# Patient Record
Sex: MALE | Race: BLACK OR AFRICAN AMERICAN | Employment: UNEMPLOYED | URBAN - METROPOLITAN AREA
[De-identification: names, ages, dates, MRNs, and addresses within clinical notes are randomized per-mention and may not be internally consistent; named-entity substitution may affect disease eponyms.]

---

## 2017-01-10 ENCOUNTER — OFFICE VISIT (OUTPATIENT)
Dept: PAIN MANAGEMENT | Age: 52
End: 2017-01-10

## 2017-01-10 VITALS
WEIGHT: 310 LBS | SYSTOLIC BLOOD PRESSURE: 154 MMHG | HEART RATE: 94 BPM | DIASTOLIC BLOOD PRESSURE: 79 MMHG | BODY MASS INDEX: 43.24 KG/M2

## 2017-01-10 DIAGNOSIS — Z79.899 ENCOUNTER FOR LONG-TERM (CURRENT) USE OF HIGH-RISK MEDICATION: ICD-10-CM

## 2017-01-10 DIAGNOSIS — E11.40 PAINFUL DIABETIC NEUROPATHY (HCC): ICD-10-CM

## 2017-01-10 DIAGNOSIS — L03.119 CELLULITIS AND ABSCESS OF FOOT: ICD-10-CM

## 2017-01-10 DIAGNOSIS — I73.9 PAD (PERIPHERAL ARTERY DISEASE) (HCC): ICD-10-CM

## 2017-01-10 DIAGNOSIS — L97.509 DIABETIC FOOT ULCER ASSOCIATED WITH DIABETES MELLITUS DUE TO UNDERLYING CONDITION, UNSPECIFIED LATERALITY: ICD-10-CM

## 2017-01-10 DIAGNOSIS — E08.621 DIABETIC FOOT ULCER ASSOCIATED WITH DIABETES MELLITUS DUE TO UNDERLYING CONDITION, UNSPECIFIED LATERALITY: ICD-10-CM

## 2017-01-10 DIAGNOSIS — R26.0 SENSORY ATAXIC GAIT: ICD-10-CM

## 2017-01-10 DIAGNOSIS — M14.679 CHARCOT'S JOINT OF ANKLE, UNSPECIFIED LATERALITY: Primary | ICD-10-CM

## 2017-01-10 DIAGNOSIS — E11.40 DIABETIC NEUROPATHY, PAINFUL (HCC): ICD-10-CM

## 2017-01-10 DIAGNOSIS — L02.619 CELLULITIS AND ABSCESS OF FOOT: ICD-10-CM

## 2017-01-10 RX ORDER — OXYCODONE AND ACETAMINOPHEN 10; 325 MG/1; MG/1
TABLET ORAL
Qty: 240 TAB | Refills: 0 | Status: SHIPPED | OUTPATIENT
Start: 2017-02-08 | End: 2017-03-09 | Stop reason: SDUPTHER

## 2017-01-10 RX ORDER — OXYCODONE AND ACETAMINOPHEN 10; 325 MG/1; MG/1
TABLET ORAL
Qty: 240 TAB | Refills: 0 | Status: SHIPPED | OUTPATIENT
Start: 2017-01-10 | End: 2017-01-10 | Stop reason: SDUPTHER

## 2017-01-10 NOTE — PROGRESS NOTES
HISTORY OF PRESENT ILLNESS  Elaina Quiros is a 46 y.o. male. Patient presents for follow up of chronic lower extremity pain due to severe diabetic neuropathy as well as Charcot foot with complete arch collapse of the left foot. He is on dialysis for end-stage renal disease as well. He continues to suffer from complications related to multiple diabetic left foot ulcers, most recently on the heel. He underwent skin grafting from the right leg to the left heel, but unfortunately the graft did not take. Pain in the lower extremities remains constant but stable. He notes that the pain varies in nature and severity, and predominates in the feet and lower legs; he describes it as throbbing, aching, burning, and stabbing. Pain is significantly worse at night. . Left ankle and foot pain due to Charcot disease remains rather severe. He has had multiple surgeries related to this (all performed by podiatry), which have unfortunately been unsuccessful. Pt reports average of 4-7/10 pain scale most days, depending on activity. He denies any new symptoms. Medications continue to work well for pain control overall. Elaina Quiros is tolerating medications well, with no untoward side effects noted. He is able to stay more active with less discomfort with these current doses. The patient reports an average of 70% relief with this regimen. Most recent UDS and  were consistent with prescribed medications. Pill counts are appropriate. He is informed of side effects, risks, and benefits of this regimen, and emphasizes that he derives a significant improvement in functionality and quality of life, and notes that non-opioid medications and therapies in the past have not offered significant benefit. We discussed his recent UDS which was positive for extremely minute levels of THC (2 ng/dL). He denies any use of marijuana, stating that roommates in his home are daily pot smokers.  He was counseled to open windows and avoid incidental exposure to marijuana smoke. He denies new or worsening insomnia or constipation issues. He denies any falls, injuries, or hospitalizations since the last visit. HPI--see above    ROS  Constitutional: Positive for malaise/fatigue. Musculoskeletal: Positive for myalgias and joint pain. Neurological: Positive for tingling, sensory change and focal weakness (of lower extremities). Physical Exam  Constitutional: He is oriented to person, place, and time. He appears well-developed and well-nourished. Talkative, pleasant male in visible discomfort  Wheel-chair bound   HENT:   Head: Normocephalic and atraumatic.        edentulous   Eyes: EOM are normal. Pupils are equal, round, and reactive to light. Musculoskeletal:        Left ankle: He exhibits decreased range of motion, swelling and deformity (rocker-bottom foot). tenderness. Left lower leg in rigid brace. Great toe absent   Neurological: He is alert and oriented to person, place, and time. He displays atrophy. A sensory deficit is present. No cranial nerve deficit. He exhibits abnormal muscle tone (of left lower extremity). Coordination abnormal.        Diminished sensation of lower limbs to knees bilaterally  1-2 + edema, worse on the left   Psychiatric: He has a normal mood and affect. His behavior is normal. Judgment and thought content normal.   ASSESSMENT and PLAN  Encounter Diagnoses   Name Primary?     Diabetic neuropathy, painful ([address]     Painful diabetic neuropathy ([address]     PAD (peripheral artery disease) ([address]     Cellulitis and abscess of foot     Encounter for long-term (current) use of high-risk medication     Sensory ataxic gait     Diabetic foot ulcer associated with diabetes mellitus due to underlying condition, unspecified laterality ([address]     Charcot's joint of ankle, unspecified laterality Yes   Plan:  Continue same medications as prescribed for chronic pain  Follow up in 3 months or sooner if needed  Regular exercise and attention to emotional health and diet remain the most effective ways to treat chronic pain of all kinds  You may contact me with questions or concerns through [address]

## 2017-01-10 NOTE — MR AVS SNAPSHOT
Visit Information Date & Time Provider Department Dept. Phone Encounter #  
 1/10/2017  2:20 PM Quin Campbell PA-C [address] [place] [address] for Pain Management [phone] [id number] Follow-up Instructions Return in about 3 months (around 4/10/2017). Follow-up and Disposition History Upcoming Health Maintenance Date Due Hepatitis C Screening 1965 FOOT EXAM Q1 6/5/1973 MICROALBUMIN Q1 6/5/1973 EYE EXAM RETINAL OR DILATED Q1 6/5/1973 Pneumococcal 19-64 Highest Risk (1 of 3 - PCV13) 6/5/1982 DTaP/Tdap/Td series (1 - Tdap) 6/5/1984 FOBT Q 1 YEAR AGE 50-75 6/5/2013 INFLUENZA AGE 9 TO ADULT 8/1/2016 HEMOGLOBIN A1C Q6M 2/16/2017 LIPID PANEL Q1 8/16/2017 Allergies as of 1/10/2017  Review Complete On: 11/10/2016 By: WILLIAM Null Severity Noted Reaction Type Reactions Aspirin  03/26/2012    Other (comments) GI distress, unknown Ibuprofen  10/30/2014    Other (comments) Current Immunizations  Never Reviewed No immunizations on file. Not reviewed this visit You Were Diagnosed With   
  
 Codes Comments Charcot's joint of ankle, unspecified laterality    -  Primary ICD-10-CM: E65.162 ICD-9-CM: 094.0, 713.5 Diabetic neuropathy, painful ([facility]     ICD-10-CM: E11.40 ICD-9-CM: 250.60, 357.2 Painful diabetic neuropathy (HCC)     ICD-10-CM: E11.40 ICD-9-CM: 250.60, 357.2 PAD (peripheral artery disease) (HCC)     ICD-10-CM: I73.9 ICD-9-CM: 443.9 Cellulitis and abscess of foot     ICD-10-CM: L03.119, L02.619 ICD-9-CM: 682.7 Encounter for long-term (current) use of high-risk medication     ICD-10-CM: Z79.899 ICD-9-CM: V58.69 Sensory ataxic gait     ICD-10-CM: R26.0 ICD-9-CM: 781.2 Diabetic foot ulcer associated with diabetes mellitus due to underlying condition, unspecified laterality ([facility]     ICD-10-CM: K91.327, Z57.313 ICD-9-CM: 249.80, 707.15 Vitals BP Pulse Weight(growth percentile) BMI Smoking Status 154/79 94 310 lb (140.6 kg) 43.24 kg/m2 Current Every Day Smoker BMI and BSA Data Body Mass Index Body Surface Area  
 43.24 kg/m 2 2.65 m 2 Preferred Pharmacy Pharmacy Name Phone [address] [place] [address] [address], [address] [phone] Your Updated Medication List  
  
   
This list is accurate as of: 1/10/17  3:27 PM.  Always use your most recent med list.  
  
  
  
  
 acetaminophen 325 mg tablet Commonly known as:  TYLENOL Take 650 mg by mouth every four (4) hours as needed for Pain. AMITIZA 24 mcg capsule Generic drug:  lubiPROStone Take 24 mcg by mouth daily (with breakfast). ANUSOL-HC 25 mg Supp Generic drug:  hydrocortisone Insert 25 mg into rectum every twelve (12) hours as needed. aspirin delayed-release 81 mg tablet Take 1 Tab by mouth daily. atorvastatin 40 mg tablet Commonly known as:  LIPITOR Take 1 Tab by mouth daily. citalopram 10 mg tablet Commonly known as:  Adelia Babe Take 10 mg by mouth daily. clopidogrel 75 mg Tab Commonly known as:  PLAVIX Take 1 Tab by mouth daily. COLACE 100 mg capsule Generic drug:  docusate sodium Take 100 mg by mouth daily. fluticasone 50 mcg/actuation nasal spray Commonly known as:  FLONASE  
1 [place] by Both Nostrils route two (2) times a day. FOSRENOL 1,000 mg chewable tablet Generic drug:  lanthanum Take 1,000 mg by mouth three (3) times daily. glipiZIDE SR 10 mg CR tablet Commonly known as:  GLUCOTROL XL Take 10 mg by mouth two (2) times a day. LANTUS 100 unit/mL injection Generic drug:  insulin glargine Inject 10 units Subcutaneously every morning and 30 units Subcutaneously at bedtime  
  
 lidocaine 5 % ointment Commonly known as:  XYLOCAINE Apply 1 g to affected area as needed for Pain. Apply to fistula site before diaylisis LINZESS 290 mcg Cap capsule Generic drug:  linaclotide Take 290 mcg by mouth Daily (before breakfast). LOVAZA 1 gram capsule Generic drug:  omega-3 acid ethyl esters Take 1 g by mouth daily (with breakfast). metoprolol succinate 50 mg XL tablet Commonly known as:  TOPROL-XL Take 50 mg by mouth daily. MIRALAX 17 gram packet Generic drug:  polyethylene glycol Take 17 g by mouth daily. NEPHROCAPS 1 mg capsule Generic drug:  b complex-vitamin c-folic acid Take 1 Cap by mouth daily. NovoLOG Flexpen 100 unit/mL Inpn Generic drug:  insulin aspart  
by SubCUTAneous route. oxyCODONE-acetaminophen  mg per tablet Commonly known as:  PERCOCET 10 Take 1-2 tabs by mouth every 6 hours as needed for chronic pain. Do Not Exceed 8 tabs a Day. Start taking on:  2/8/2017  
  
 pantoprazole 40 mg tablet Commonly known as:  PROTONIX Take 40 mg by mouth daily. RENVELA 800 mg Tab tab Generic drug:  sevelamer carbonate Take 1,600 mg by mouth three (3) times daily (with meals). REQUIP 0.25 mg tablet Generic drug:  rOPINIRole Take 0.25 mg by mouth daily. SENSIPAR 60 mg Tab Generic drug:  cinacalcet Take 60 mg by mouth daily. ZyrTEC 10 mg Cap Generic drug:  Cetirizine Take 10 mg by mouth daily. Prescriptions Printed Refills  
 oxyCODONE-acetaminophen (PERCOCET 10)  mg per tablet 0 Starting on: 2/8/2017 Sig: Take 1-2 tabs by mouth every 6 hours as needed for chronic pain. Do Not Exceed 8 tabs a Day. Class: Print Follow-up Instructions Return in about 3 months (around 4/10/2017). Patient Instructions Plan: 
Continue same medications as prescribed for chronic pain Follow up in 3 months or sooner if needed Regular exercise and attention to emotional health and diet remain the most effective ways to treat chronic pain of all kinds You may contact me with questions or concerns through [address] Introducing [facility]! Ubaldo Hebert introduces [employer] patient portal. Now you can access parts of your medical record, email your doctor's office, and request medication refills online. 1. In your internet browser, go to [email or web address] 2. Click on the First Time User? Click Here link in the Sign In box. You will see the New Member Sign Up page. 3. Enter your [employer] Access Code exactly as it appears below. You will not need to use this code after youve completed the sign-up process. If you do not sign up before the expiration date, you must request a new code. · Fli[employer] Access Code: [email or web address] Expires: 2/8/2017 11:15 AM 
 
4. Enter the last four digits of your Social Security Number (xxxx) and Date of Birth (mm/dd/yyyy) as indicated and click Submit. You will be taken to the next sign-up page. 5. Create a Fliqq ID. This will be your Fliqq login ID and cannot be changed, so think of one that is secure and easy to remember. 6. Create a Fliqq password. You can change your password at any time. 7. Enter your Password Reset Question and Answer. This can be used at a later time if you forget your password. 8. Enter your e-mail address. You will receive e-mail notification when new information is available in [address] 9. Click Sign Up. You can now view and download portions of your medical record. 10. Click the Download Summary menu link to download a portable copy of your medical information. If you have questions, please visit the Frequently Asked Questions section of the Fliqq website. Remember, Fliqq is NOT to be used for urgent needs. For medical emergencies, dial 911. Now available from your iPhone and Android! Please provide this summary of care documentation to your next provider. Your primary care clinician is listed as PROVIDER UNKNOWN.  If you have any questions after today's visit, please call [phone].

## 2017-01-10 NOTE — PROGRESS NOTES
Nursing Notes    Patient presents to the office today in follow-up. Patient rates his pain at 3/10 on the numerical pain scale. Reviewed medications with counts as follows:    Rx Date filled Qty Dispensed Pill Count Last Dose Short   Percocet 10 12/12/16 240 24 1/10/17 no         Comments:     POC UDS was performed in office today, oral swab    Any new labs or imaging since last appointment? YES, pre op labs and imaging, dialysis blood work  Have you been to an emergency room (ER) or urgent care clinic since your last visit? Yes, for heart and leg            Have you been hospitalized since your last visit? Yes, after surgery at [facility] for heart issues   If yes, where, when, and reason for visit? Have you seen or consulted any other health care providers outside of the [address] [place] [address]  since your last visit? NO     If yes, where, when, and reason for visit? Mr. Dagmar Shah has a reminder for a \"due or due soon\" health maintenance. I have asked that he contact his primary care provider for follow-up on this health maintenance.

## 2017-03-09 ENCOUNTER — OFFICE VISIT (OUTPATIENT)
Dept: PAIN MANAGEMENT | Age: 52
End: 2017-03-09

## 2017-03-09 VITALS — DIASTOLIC BLOOD PRESSURE: 81 MMHG | HEART RATE: 92 BPM | RESPIRATION RATE: 19 BRPM | SYSTOLIC BLOOD PRESSURE: 149 MMHG

## 2017-03-09 DIAGNOSIS — G56.22 ULNAR NEUROPATHY OF LEFT UPPER EXTREMITY: ICD-10-CM

## 2017-03-09 DIAGNOSIS — E11.40 PAINFUL DIABETIC NEUROPATHY (HCC): Primary | ICD-10-CM

## 2017-03-09 DIAGNOSIS — L97.509 DIABETIC FOOT ULCER ASSOCIATED WITH DIABETES MELLITUS DUE TO UNDERLYING CONDITION, UNSPECIFIED LATERALITY: ICD-10-CM

## 2017-03-09 DIAGNOSIS — E66.01 MORBID OBESITY DUE TO EXCESS CALORIES (HCC): ICD-10-CM

## 2017-03-09 DIAGNOSIS — Z79.899 ENCOUNTER FOR LONG-TERM (CURRENT) USE OF HIGH-RISK MEDICATION: ICD-10-CM

## 2017-03-09 DIAGNOSIS — M14.679 CHARCOT'S JOINT OF ANKLE, UNSPECIFIED LATERALITY: ICD-10-CM

## 2017-03-09 DIAGNOSIS — E08.621 DIABETIC FOOT ULCER ASSOCIATED WITH DIABETES MELLITUS DUE TO UNDERLYING CONDITION, UNSPECIFIED LATERALITY: ICD-10-CM

## 2017-03-09 DIAGNOSIS — E11.40 DIABETIC NEUROPATHY, PAINFUL (HCC): ICD-10-CM

## 2017-03-09 RX ORDER — OXYCODONE AND ACETAMINOPHEN 10; 325 MG/1; MG/1
TABLET ORAL
Qty: 240 TAB | Refills: 0 | Status: SHIPPED | OUTPATIENT
Start: 2017-03-09 | End: 2017-03-09 | Stop reason: SDUPTHER

## 2017-03-09 RX ORDER — OXYCODONE AND ACETAMINOPHEN 10; 325 MG/1; MG/1
TABLET ORAL
Qty: 240 TAB | Refills: 0 | Status: SHIPPED | OUTPATIENT
Start: 2017-04-07 | End: 2017-03-09 | Stop reason: SDUPTHER

## 2017-03-09 RX ORDER — OXYCODONE AND ACETAMINOPHEN 10; 325 MG/1; MG/1
TABLET ORAL
Qty: 240 TAB | Refills: 0 | Status: SHIPPED | OUTPATIENT
Start: 2017-05-06 | End: 2017-05-30 | Stop reason: SDUPTHER

## 2017-03-09 NOTE — PROGRESS NOTES
Nursing Notes    Patient presents to the office today in follow-up. Reviewed medications with counts as follows:    Rx Date filled Qty Dispensed Pill Count Last Dose Short   Percocet 10/325 2/8/17 240 33 today no   Mr. Isidoro Jacob has a reminder for a \"due or due soon\" health maintenance. I have asked that he contact his primary care provider for follow-up on this health maintenance. POC UDS was not performed in office today    Any new labs or imaging since last appointment? YES    Have you been to an emergency room (ER) or urgent care clinic since your last visit? NO            Have you been hospitalized since your last visit? YES     If yes, where, when, and reason for visit?   5 days at [place] for foot surgery    Have you seen or consulted any other health care providers outside of the [address] [place] [address]  since your last visit? YES     If yes, where, when, and reason for visit?

## 2017-03-09 NOTE — MR AVS SNAPSHOT
Visit Information Date & Time Provider Department Dept. Phone Encounter #  
 3/9/2017  2:00 PM Jackie Solis [facility] for Pain Management [phone] [id number] Follow-up Instructions Return in about 3 months (around 6/9/2017). Follow-up and Disposition History Upcoming Health Maintenance Date Due Hepatitis C Screening 1965 FOOT EXAM Q1 6/5/1973 MICROALBUMIN Q1 6/5/1973 EYE EXAM RETINAL OR DILATED Q1 6/5/1973 Pneumococcal 19-64 Highest Risk (1 of 3 - PCV13) 6/5/1982 DTaP/Tdap/Td series (1 - Tdap) 6/5/1984 FOBT Q 1 YEAR AGE 50-75 6/5/2013 INFLUENZA AGE 9 TO ADULT 8/1/2016 HEMOGLOBIN A1C Q6M 2/16/2017 LIPID PANEL Q1 8/16/2017 Allergies as of 3/9/2017  Review Complete On: 3/9/2017 By: Vaishali Kelley LPN Severity Noted Reaction Type Reactions Aspirin  03/26/2012    Other (comments) GI distress, unknown Ibuprofen  10/30/2014    Other (comments) Current Immunizations  Never Reviewed No immunizations on file. Not reviewed this visit You Were Diagnosed With   
  
 Codes Comments Painful diabetic neuropathy ([facility]    -  Primary ICD-10-CM: E11.40 ICD-9-CM: 250.60, 357.2 Ulnar neuropathy of left upper extremity     ICD-10-CM: G56.22 
ICD-9-CM: 354.2 Diabetic neuropathy, painful ([facility]     ICD-10-CM: E11.40 ICD-9-CM: 250.60, 357.2 Charcot's joint of ankle, unspecified laterality     ICD-10-CM: M14.679 ICD-9-CM: 094.0, 713.5 Diabetic foot ulcer associated with diabetes mellitus due to underlying condition, unspecified laterality ([facility]     ICD-10-CM: K97.642, Z37.996 ICD-9-CM: 249.80, 707.15 Encounter for long-term (current) use of high-risk medication     ICD-10-CM: Z79.899 ICD-9-CM: V58.69 Morbid obesity due to excess calories (HCC)     ICD-10-CM: E66.01 
ICD-9-CM: 278.01 Vitals BP Pulse Resp Smoking Status 149/81 92 19 Current Every Day Smoker Vitals History Preferred Pharmacy Pharmacy Name Phone [address] [place] [address] [address], [address] [facility] [place]. [phone] Your Updated Medication List  
  
   
This list is accurate as of: 3/9/17  2:45 PM.  Always use your most recent med list.  
  
  
  
  
 acetaminophen 325 mg tablet Commonly known as:  TYLENOL Take 650 mg by mouth every four (4) hours as needed for Pain. AMITIZA 24 mcg capsule Generic drug:  lubiPROStone Take 24 mcg by mouth daily (with breakfast). ANUSOL-HC 25 mg Supp Generic drug:  hydrocortisone Insert 25 mg into rectum every twelve (12) hours as needed. aspirin delayed-release 81 mg tablet Take 1 Tab by mouth daily. atorvastatin 40 mg tablet Commonly known as:  LIPITOR Take 1 Tab by mouth daily. citalopram 10 mg tablet Commonly known as:  Mj Flatter Take 10 mg by mouth daily. clopidogrel 75 mg Tab Commonly known as:  PLAVIX Take 1 Tab by mouth daily. COLACE 100 mg capsule Generic drug:  docusate sodium Take 100 mg by mouth daily. fluticasone 50 mcg/actuation nasal spray Commonly known as:  FLONASE  
1 [place] by Both Nostrils route two (2) times a day. FOSRENOL 1,000 mg chewable tablet Generic drug:  lanthanum Take 1,000 mg by mouth three (3) times daily. glipiZIDE SR 10 mg CR tablet Commonly known as:  GLUCOTROL XL Take 10 mg by mouth two (2) times a day. LANTUS 100 unit/mL injection Generic drug:  insulin glargine Inject 10 units Subcutaneously every morning and 30 units Subcutaneously at bedtime  
  
 lidocaine 5 % ointment Commonly known as:  XYLOCAINE Apply 1 g to affected area as needed for Pain. Apply to fistula site before diaylisis LINZESS 290 mcg Cap capsule Generic drug:  linaclotide Take 290 mcg by mouth Daily (before breakfast). LOVAZA 1 gram capsule Generic drug:  omega-3 acid ethyl esters Take 1 g by mouth daily (with breakfast). metoprolol succinate 50 mg XL tablet Commonly known as:  TOPROL-XL Take 50 mg by mouth daily. MIRALAX 17 gram packet Generic drug:  polyethylene glycol Take 17 g by mouth daily. NEPHROCAPS 1 mg capsule Generic drug:  b complex-vitamin c-folic acid Take 1 Cap by mouth daily. NovoLOG Flexpen 100 unit/mL Inpn Generic drug:  insulin aspart  
by SubCUTAneous route. oxyCODONE-acetaminophen  mg per tablet Commonly known as:  PERCOCET 10 Take 1-2 tabs by mouth every 6 hours as needed for chronic pain. Do Not Exceed 8 tabs a Day. Start taking on:  5/6/2017  
  
 pantoprazole 40 mg tablet Commonly known as:  PROTONIX Take 40 mg by mouth daily. RENVELA 800 mg Tab tab Generic drug:  sevelamer carbonate Take 1,600 mg by mouth three (3) times daily (with meals). REQUIP 0.25 mg tablet Generic drug:  rOPINIRole Take 0.25 mg by mouth daily. SENSIPAR 60 mg Tab Generic drug:  cinacalcet Take 60 mg by mouth daily. ZyrTEC 10 mg Cap Generic drug:  Cetirizine Take 10 mg by mouth daily. Prescriptions Printed Refills  
 oxyCODONE-acetaminophen (PERCOCET 10)  mg per tablet 0 Starting on: 5/6/2017 Sig: Take 1-2 tabs by mouth every 6 hours as needed for chronic pain. Do Not Exceed 8 tabs a Day. Class: Print Follow-up Instructions Return in about 3 months (around 6/9/2017). Patient Instructions Plan: 
Continue same medications as prescribed for chronic pain Follow up in 3 months or sooner if needed Regular exercise and attention to emotional health and diet remain the most effective ways to treat chronic pain of all kinds You may contact me with questions or concerns through [address] Introducing [facility]!    
 [employer] introduces [employer] patient portal. Now you can access parts of your medical record, email your doctor's office, and request medication refills online. 1. In your internet browser, go to [email or web address] 2. Click on the First Time User? Click Here link in the Sign In box. You will see the New Member Sign Up page. 3. Enter your [employer] Access Code exactly as it appears below. You will not need to use this code after youve completed the sign-up process. If you do not sign up before the expiration date, you must request a new code. · [employer] Access Code: [email or web address] Expires: 6/7/2017  2:45 PM 
 
4. Enter the last four digits of your Social Security Number (xxxx) and Date of Birth (mm/dd/yyyy) as indicated and click Submit. You will be taken to the next sign-up page. 5. Create a [employer] ID. This will be your [employer] login ID and cannot be changed, so think of one that is secure and easy to remember. 6. Create a [employer] password. You can change your password at any time. 7. Enter your Password Reset Question and Answer. This can be used at a later time if you forget your password. 8. Enter your e-mail address. You will receive e-mail notification when new information is available in [address] 9. Click Sign Up. You can now view and download portions of your medical record. 10. Click the Download Summary menu link to download a portable copy of your medical information. If you have questions, please visit the Frequently Asked Questions section of the [employer] website. Remember, [employer] is NOT to be used for urgent needs. For medical emergencies, dial 911. Now available from your iPhone and Android! Please provide this summary of care documentation to your next provider. Your primary care clinician is listed as PROVIDER UNKNOWN. If you have any questions after today's visit, please call [phone].

## 2017-03-09 NOTE — PROGRESS NOTES
HISTORY OF PRESENT ILLNESS  Hernando Antonio is a 46 y.o. male. Patient presents for follow up of chronic lower extremity pain due to severe diabetic neuropathy as well as Charcot foot with complete arch collapse of the left foot. He is on dialysis for end-stage renal disease as well. He reports that he was hospitalized and underwent urgent surgery to correct what sounds like severe PAD in the right lower leg. He had a vein removed from his left flank to perform bypass surgery as well as skin grafting of the heel. He was in ICU in for four days due to complications from surgery, but it is unclear what these were. He is slowly recovering, and has his leg wrapped in a pillowcase to avoid contact with ambient air and other surfaces. He was not permitted any additional medications for pain. He states he is feeling \"a whole lot better\" but is still having a great deal of discomfort at times. He also complains of pain in the left anterior thigh where they harvested skin for his grafting surgery. He was able to avoid amputation of the right leg \"barely\". Medications continue to work well for pain control overall. Hernando Antonio is tolerating medications well, with no untoward side effects noted. He is able to stay more active with less discomfort with these current doses. The patient reports an average of 60-70 % relief with this regimen. Most recent UDS and  were consistent with prescribed medications. Pill counts are appropriate. He is informed of side effects, risks, and benefits of this regimen, and emphasizes that he derives a significant improvement in functionality and quality of life, and notes that non-opioid medications and therapies in the past have not offered significant benefit. He denies new or worsening insomnia or constipation issues. He denies any falls, injuries, or hospitalizations since the last visit. HPI--see above    ROS  Constitutional: Positive for malaise/fatigue.    Musculoskeletal: Positive for myalgias and joint pain. Neurological: Positive for tingling, sensory change and focal weakness (of lower extremities). Physical Exam  Constitutional: He is oriented to person, place, and time. He appears well-developed and well-nourished. Talkative, pleasant male in visible discomfort  Wheel-chair bound   HENT:   Head: Normocephalic and atraumatic.        edentulous   Eyes: EOM are normal. Pupils are equal, round, and reactive to light. Musculoskeletal:        Left ankle: He exhibits decreased range of motion, swelling and deformity (rocker-bottom foot). tenderness. Left lower leg in rigid brace. Great toe absent  Right leg wrapped in a pillowcase   Neurological: He is alert and oriented to person, place, and time. He displays atrophy. A sensory deficit is present. No cranial nerve deficit. He exhibits abnormal muscle tone (of left lower extremity). Coordination abnormal.        Diminished sensation of lower limbs to knees bilaterally  1-2 + edema, worse on the left   Psychiatric: He has a normal mood and affect. His behavior is normal. Judgment and thought content normal.   ASSESSMENT and PLAN    ICD-10-CM ICD-9-CM    1. Painful diabetic neuropathy (HCC) E11.40 250.60      357.2    2. Ulnar neuropathy of left upper extremity G56.22 354.2    3. Diabetic neuropathy, painful (HCC) E11.40 250.60      357.2    4. Charcot's joint of ankle, unspecified laterality M14.679 094.0      713.5    5. Diabetic foot ulcer associated with diabetes mellitus due to underlying condition, unspecified laterality ([facility] E08.621 249.80     L97.509 707.15    6. Encounter for long-term (current) use of high-risk medication Z79.899 V58.69    7.  Morbid obesity due to excess calories ([facility]) E66.01 278.01       Plan:  Continue same medications as prescribed for chronic pain  Follow up in 3 months or sooner if needed  Regular exercise and attention to emotional health and diet remain the most effective ways to treat chronic pain of all kinds  You may contact me with questions or concerns through [address]

## 2017-05-08 PROBLEM — L08.9 DIABETIC FOOT INFECTION (HCC): Status: ACTIVE | Noted: 2017-05-08

## 2017-05-08 PROBLEM — R50.9 FEVER: Status: ACTIVE | Noted: 2017-05-08

## 2017-05-08 PROBLEM — D72.829 LEUKOCYTOSIS: Status: ACTIVE | Noted: 2017-05-08

## 2017-05-08 PROBLEM — E11.628 DIABETIC FOOT INFECTION (HCC): Status: ACTIVE | Noted: 2017-05-08

## 2017-05-17 ENCOUNTER — TELEPHONE (OUTPATIENT)
Dept: PAIN MANAGEMENT | Age: 52
End: 2017-05-17

## 2017-05-30 ENCOUNTER — HOSPITAL ENCOUNTER (OUTPATIENT)
Dept: LAB | Age: 52
Discharge: HOME | End: 2017-05-30
Payer: MEDICARE

## 2017-05-30 ENCOUNTER — OFFICE VISIT (OUTPATIENT)
Dept: PAIN MANAGEMENT | Age: 52
End: 2017-05-30

## 2017-05-30 VITALS
HEART RATE: 109 BPM | SYSTOLIC BLOOD PRESSURE: 99 MMHG | DIASTOLIC BLOOD PRESSURE: 65 MMHG | WEIGHT: 285 LBS | BODY MASS INDEX: 37.6 KG/M2

## 2017-05-30 DIAGNOSIS — Z79.899 ENCOUNTER FOR LONG-TERM (CURRENT) USE OF HIGH-RISK MEDICATION: ICD-10-CM

## 2017-05-30 DIAGNOSIS — Z79.899 ENCOUNTER FOR LONG-TERM (CURRENT) USE OF HIGH-RISK MEDICATION: Primary | ICD-10-CM

## 2017-05-30 DIAGNOSIS — E11.40 PAINFUL DIABETIC NEUROPATHY (HCC): Primary | ICD-10-CM

## 2017-05-30 DIAGNOSIS — G89.4 CHRONIC PAIN SYNDROME: ICD-10-CM

## 2017-05-30 PROCEDURE — 36415 COLL VENOUS BLD VENIPUNCTURE: CPT | Performed by: PSYCHIATRY & NEUROLOGY

## 2017-05-30 PROCEDURE — 80307 DRUG TEST PRSMV CHEM ANLYZR: CPT | Performed by: PSYCHIATRY & NEUROLOGY

## 2017-05-30 RX ORDER — OXYCODONE AND ACETAMINOPHEN 10; 325 MG/1; MG/1
TABLET ORAL
Qty: 240 TAB | Refills: 0 | Status: SHIPPED | OUTPATIENT
Start: 2017-06-14 | End: 2018-05-07

## 2017-05-30 RX ORDER — NALOXONE HYDROCHLORIDE 4 MG/.1ML
4 SPRAY NASAL AS NEEDED
Qty: 1 BOX | Refills: 1 | Status: SHIPPED | OUTPATIENT
Start: 2017-05-30 | End: 2018-05-07

## 2017-05-30 RX ORDER — OXYCODONE AND ACETAMINOPHEN 10; 325 MG/1; MG/1
TABLET ORAL
Qty: 240 TAB | Refills: 0 | Status: SHIPPED | OUTPATIENT
Start: 2017-08-10 | End: 2017-10-17 | Stop reason: SDUPTHER

## 2017-05-30 RX ORDER — OXYCODONE AND ACETAMINOPHEN 10; 325 MG/1; MG/1
TABLET ORAL
Qty: 240 TAB | Refills: 0 | Status: SHIPPED | OUTPATIENT
Start: 2017-07-12 | End: 2017-09-26 | Stop reason: SDUPTHER

## 2017-05-30 NOTE — PROGRESS NOTES
HISTORY OF PRESENT ILLNESS  Marino Morin is a 46 y.o. male. HPI Patient presents for follow up of chronic lower extremity pain due to severe diabetic neuropathy as well as Charcot foot with complete arch collapse of the left foot. He is on dialysis for end-stage renal disease as well. Since last seen, he was hospitalized secondary to leukocytosis and an infected diabetic foot ulcer. Medications continue to work well for pain control overall. Marino Morin is tolerating medications well, with no untoward side effects noted. He is able to stay more active with less discomfort with these current doses. The patient reports an average of 60-70 % relief with this regimen. Most recent UDS and  were consistent with prescribed medications. Pill counts are appropriate. He is informed of side effects, risks, and benefits of this regimen, and emphasizes that he derives a significant improvement in functionality and quality of life, and notes that non-opioid medications and therapies in the past have not offered significant benefit. Pain level today 7 out of 10, outcome 14/28,(The lower the upper number, the better the outcome)  Physical activity and mobility remain curtailed, he is wheelchair-bound. Sleep and mood are fair. Mild constipation reported. The opioid-induced constipation protocol is reviewed and suggestions implemented. A current review of the  does not identify any inconsistency. A urine drug screen could not be obtained today. Accordingly a salivary fluid specimen is obtained and forwarded to the laboratory for analysis. Review of Systems   Constitutional: Positive for malaise/fatigue. HENT: Positive for hearing loss. Eyes: Positive for pain. Respiratory: Positive for wheezing. Cardiovascular: Positive for leg swelling. Gastrointestinal: Positive for constipation, heartburn, nausea and vomiting. Musculoskeletal: Positive for back pain, joint pain and myalgias. Neurological: Positive for tingling, sensory change and focal weakness. Psychiatric/Behavioral: The patient has insomnia. All other systems reviewed and are negative. Physical Exam   Constitutional: He is oriented to person, place, and time. He appears well-developed and well-nourished. No distress. No teeth   HENT:   Head: Normocephalic. Right Ear: External ear normal.   Left Ear: External ear normal.   Eyes: Conjunctivae and EOM are normal. Pupils are equal, round, and reactive to light. Neck: Normal range of motion. Pulmonary/Chest: Effort normal. No respiratory distress. Musculoskeletal:        Right foot: There is tenderness (left great toe amputated). Left foot: There is decreased range of motion and tenderness (in air cast). Diabetic foot ulcers both feet     Neurological: He is alert and oriented to person, place, and time. Gait (antalgic) abnormal.   Skin: Skin is warm and dry. Psychiatric: He has a normal mood and affect. His behavior is normal. Judgment and thought content normal.   Nursing note and vitals reviewed. ASSESSMENT and PLAN  Encounter Diagnoses   Name Primary?  Painful diabetic neuropathy ([facility] Yes    Encounter for long-term (current) use of high-risk medication     Chronic pain syndrome      He will continue his current analgesic regimen as this is providing adequate pain control with improved functionality and minimal side effects. Because the patient's current regimen places him/her at increased risk for possible overdose, a prescription for naloxone nasal spray is being provided. The patient understands that this medication is only to be used in the setting of a possible overdose and that inadvertent use of this medication could precipitate overt withdrawal.  3 month reassess him      No concerns are raised for misuse, abuse, or diversion.     1. Pain medications are prescribed with the objective of pain relief and improved physical and psychosocial function in this patient. 2. Counseled patient on proper use of prescribed medications and reviewed opioid contract. 3. Counseled patient about chronic medical conditions and their relationship to anxiety and depression and recommended mental health support as needed. 4. Reviewed with patient self-help tools, home exercise, and lifestyle changes to assist the patient in self-management of symptoms. 5. Advised patient to have a primary care provider to continue care for health maintenance and general medical conditions and support for referral to specialty care as needed. 6. Reviewed with patient the treatment plan, goals of treatment plan, and limitations of treatment plan, to include the potential for side effects from medications and procedures. If side effects occur, it is the responsibility of the patient to inform the clinic so that a change in the treatment plan can be made in a safe manner. The patient is advised that stopping prescribed medication may cause an increase in symptoms and possible medication withdrawal symptoms. The patient is informed an emergency room evaluation may be necessary if this occurs. DISPOSITION: The patients condition and plan were discussed at length and all questions were answered. The patient agrees with the plan.     Counseling occupied > 50% of visit:  Total time: 30 minutes

## 2017-05-30 NOTE — PROGRESS NOTES
Romario Giraldo Notes    Patient presents to the office today in follow-up. Patient rates his pain at 7/10 on the numerical pain scale. Reviewed medications with counts as follows:    Rx Date filled Qty Dispensed Pill Count Last Dose Short   Percocet 10/325 mg 05/16/17 240 89 05/30/17 One day   Comments:Pt is one day short,  States taking little more     POC UDS was performed in office today,oral swab    Any new labs or imaging since last appointment? YES per op labs and imaging for skin grafting    Have you been to an emergency room (ER) or urgent care clinic since your last visit? YES            Have you been hospitalized since your last visit? YES     If yes, where, when, and reason for visit? [facility]      Have you seen or consulted any other health care providers outside of the [address] [place] [address]  since your last visit? YES     If yes, where, when, and reason for visit?

## 2017-05-30 NOTE — MR AVS SNAPSHOT
Visit Information Date & Time Provider Department Dept. Phone Encounter #  
 5/30/2017 10:20 AM Mary Haider MD [address] [place] [address] for Pain Management [phone] [id number] Follow-up Instructions Return in about 3 months (around 8/30/2017). Follow-up and Disposition History Upcoming Health Maintenance Date Due Hepatitis C Screening 1965 FOOT EXAM Q1 6/5/1973 MICROALBUMIN Q1 6/5/1973 EYE EXAM RETINAL OR DILATED Q1 6/5/1973 Pneumococcal 19-64 Highest Risk (1 of 3 - PCV13) 6/5/1982 DTaP/Tdap/Td series (1 - Tdap) 6/5/1984 FOBT Q 1 YEAR AGE 50-75 6/5/2013 INFLUENZA AGE 9 TO ADULT 8/1/2017 LIPID PANEL Q1 8/16/2017 HEMOGLOBIN A1C Q6M 11/8/2017 Allergies as of 5/30/2017  Review Complete On: 5/30/2017 By: Mary Haider MD  
  
 Severity Noted Reaction Type Reactions Aspirin  03/26/2012    Other (comments) GI distress, unknown Ibuprofen  10/30/2014    Other (comments) Current Immunizations  Never Reviewed No immunizations on file. Not reviewed this visit You Were Diagnosed With   
  
 Codes Comments Painful diabetic neuropathy ([facility]    -  Primary ICD-10-CM: E11.40 ICD-9-CM: 250.60, 357.2 Encounter for long-term (current) use of high-risk medication     ICD-10-CM: Z79.899 ICD-9-CM: V58.69 Chronic pain syndrome     ICD-10-CM: G89.4 ICD-9-CM: 338. 4 Vitals BP Pulse Weight(growth percentile) BMI Smoking Status 99/65 (!) 109 285 lb (129.3 kg) 37.6 kg/m2 Current Every Day Smoker BMI and BSA Data Body Mass Index Body Surface Area  
 37.6 kg/m 2 2.58 m 2 Preferred Pharmacy Pharmacy Name Phone [address] [place] [address] [address], [address] [phone] Your Updated Medication List  
  
   
This list is accurate as of: 5/30/17 12:15 PM.  Always use your most recent med list.  
  
  
  
  
 acetaminophen 325 mg tablet Commonly known as:  TYLENOL  
 Take 650 mg by mouth every four (4) hours as needed for Pain. atorvastatin 40 mg tablet Commonly known as:  LIPITOR Take 1 Tab by mouth daily. clopidogrel 75 mg Tab Commonly known as:  PLAVIX Take 1 Tab by mouth daily. COLACE 100 mg capsule Generic drug:  docusate sodium Take 100 mg by mouth daily. FOSRENOL 1,000 mg chewable tablet Generic drug:  lanthanum Take 1,000 mg by mouth three (3) times daily. glipiZIDE SR 10 mg CR tablet Commonly known as:  GLUCOTROL XL Take 10 mg by mouth two (2) times a day. levoFLOXacin 500 mg tablet Commonly known as:  Laretta Bending Take 1 Tab by mouth every fourty-eight (48) hours. lidocaine 5 % ointment Commonly known as:  XYLOCAINE Apply 1 g to affected area as needed for Pain. Apply to fistula site before diaylisis LINZESS 290 mcg Cap capsule Generic drug:  linaclotide Take 290 mcg by mouth Daily (before breakfast). naloxone 4 mg/actuation Spry 4 mg by Nasal route as needed for up to 2 doses. Indications: OPIOID TOXICITY  
  
 NEPHROCAPS 1 mg capsule Generic drug:  b complex-vitamin c-folic acid Take 1 Cap by mouth daily. * oxyCODONE-acetaminophen  mg per tablet Commonly known as:  PERCOCET 10 Take 1-2 tabs by mouth every 6 hours as needed for chronic pain. Do Not Exceed 8 tabs a Day. Indications: Pain Start taking on:  6/14/2017 * oxyCODONE-acetaminophen  mg per tablet Commonly known as:  PERCOCET 10 Take 1-2 tabs by mouth every 6 hours as needed for chronic pain. Do Not Exceed 8 tabs a Day. Indications: Pain Start taking on:  7/12/2017 * oxyCODONE-acetaminophen  mg per tablet Commonly known as:  PERCOCET 10 Take 1-2 tabs by mouth every 6 hours as needed for chronic pain. Do Not Exceed 8 tabs a Day. Indications: Pain Start taking on:  8/10/2017  
  
 pantoprazole 40 mg tablet Commonly known as:  PROTONIX Take 40 mg by mouth daily. RENVELA 800 mg Tab tab Generic drug:  sevelamer carbonate Take 1,600 mg by mouth three (3) times daily (with meals). REQUIP 0.25 mg tablet Generic drug:  rOPINIRole Take 0.25 mg by mouth daily. SENSIPAR 60 mg Tab Generic drug:  cinacalcet Take 60 mg by mouth daily. ZyrTEC 10 mg Cap Generic drug:  Cetirizine Take 10 mg by mouth daily. * Notice: This list has 3 medication(s) that are the same as other medications prescribed for you. Read the directions carefully, and ask your doctor or other care provider to review them with you. Prescriptions Printed Refills  
 oxyCODONE-acetaminophen (PERCOCET 10)  mg per tablet 0 Starting on: 8/10/2017 Sig: Take 1-2 tabs by mouth every 6 hours as needed for chronic pain. Do Not Exceed 8 tabs a Day. Indications: Pain Class: Print  
 oxyCODONE-acetaminophen (PERCOCET 10)  mg per tablet 0 Starting on: 2017 Sig: Take 1-2 tabs by mouth every 6 hours as needed for chronic pain. Do Not Exceed 8 tabs a Day. Indications: Pain Class: Print  
 oxyCODONE-acetaminophen (PERCOCET 10)  mg per tablet 0 Starting on: 2017 Sig: Take 1-2 tabs by mouth every 6 hours as needed for chronic pain. Do Not Exceed 8 tabs a Day. Indications: Pain Class: Print Prescriptions Sent to Pharmacy Refills  
 naloxone 4 mg/actuation spry 1 Si mg by Nasal route as needed for up to 2 doses. Indications: OPIOID TOXICITY Class: Normal  
 Pharmacy: [address] [place].  #: [phone] Route: Nasal  
  
We Performed the Following DRUG SCREEN [[id number] Custom] Follow-up Instructions Return in about 3 months (around 2017). Patient Instructions Current health maintenance issues were reviewed and the patient was advised to followup with his/her PCP for completion of these items. Introducing [facility]! Shaila Nash introduces [facility] patient portal. Now you can access parts of your medical record, email your doctor's office, and request medication refills online. 1. In your internet browser, go to [email or web address] 2. Click on the First Time User? Click Here link in the Sign In box. You will see the New Member Sign Up page. 3. Enter your [facility] Access Code exactly as it appears below. You will not need to use this code after youve completed the sign-up process. If you do not sign up before the expiration date, you must request a new code. · [facility] Access Code: [email or web address] Expires: 6/7/2017  3:45 PM 
 
4. Enter the last four digits of your Social Security Number (xxxx) and Date of Birth (mm/dd/yyyy) as indicated and click Submit. You will be taken to the next sign-up page. 5. Create a [facility] ID. This will be your [facility] login ID and cannot be changed, so think of one that is secure and easy to remember. 6. Create a [facility] password. You can change your password at any time. 7. Enter your Password Reset Question and Answer. This can be used at a later time if you forget your password. 8. Enter your e-mail address. You will receive e-mail notification when new information is available in [address] 9. Click Sign Up. You can now view and download portions of your medical record. 10. Click the Download Summary menu link to download a portable copy of your medical information. If you have questions, please visit the Frequently Asked Questions section of the [facility] website. Remember, [facility] is NOT to be used for urgent needs. For medical emergencies, dial 911. Now available from your iPhone and Android! Please provide this summary of care documentation to your next provider. Your primary care clinician is listed as Jeannie August. If you have any questions after today's visit, please call [phone].

## 2017-06-09 LAB
6-ACETYLMORPHINE: NEGATIVE
AMPHETAMINES SERPL QL SCN: NEGATIVE NG/ML
BARBITURATES SERPL QL SCN: NEGATIVE UG/ML
BENZODIAZ SERPL QL SCN: NEGATIVE NG/ML
CANNABINOIDS SERPL QL SCN: NEGATIVE NG/ML
COCAINE+BZE SERPL QL SCN: NEGATIVE NG/ML
CODEINE, 737848: NEGATIVE NG/ML
DIHYDROCODEINE, 764159: NEGATIVE NG/ML
HYDROCODONE, 737854: NEGATIVE NG/ML
HYDROMORPHONE, 737852: NEGATIVE NG/ML
METHADONE SERPL QL SCN: NEGATIVE NG/ML
MORPHINE, 737850: NEGATIVE NG/ML
OPIATE CONFIRMATION,OPIC: NEGATIVE
OPIATES SERPL QL SCN: NEGATIVE NG/ML
OXYCOCONE: 15 NG/ML
OXYCODONE, 790407: NORMAL NG/ML
OXYCODONES CONFIRMATION, 738393: POSITIVE
OXYMORPHONE, 763902: NEGATIVE NG/ML
PCP SERPL QL SCN: NEGATIVE NG/ML
PROPOXYPH SERPL QL SCN: NEGATIVE NG/ML

## 2017-09-26 ENCOUNTER — OFFICE VISIT (OUTPATIENT)
Dept: PAIN MANAGEMENT | Age: 52
End: 2017-09-26

## 2017-09-26 DIAGNOSIS — E11.40 DIABETIC NEUROPATHY, PAINFUL (HCC): Primary | ICD-10-CM

## 2017-09-26 RX ORDER — OXYCODONE AND ACETAMINOPHEN 10; 325 MG/1; MG/1
TABLET ORAL
Qty: 240 TAB | Refills: 0 | Status: SHIPPED | OUTPATIENT
Start: 2017-09-26 | End: 2018-05-07

## 2017-10-17 ENCOUNTER — OFFICE VISIT (OUTPATIENT)
Dept: PAIN MANAGEMENT | Age: 52
End: 2017-10-17

## 2017-10-17 VITALS
SYSTOLIC BLOOD PRESSURE: 120 MMHG | WEIGHT: 285 LBS | BODY MASS INDEX: 37.6 KG/M2 | DIASTOLIC BLOOD PRESSURE: 70 MMHG | RESPIRATION RATE: 16 BRPM | TEMPERATURE: 98.8 F | HEART RATE: 87 BPM

## 2017-10-17 DIAGNOSIS — E11.40 PAINFUL DIABETIC NEUROPATHY (HCC): ICD-10-CM

## 2017-10-17 DIAGNOSIS — E11.40 DIABETIC NEUROPATHY, PAINFUL (HCC): Primary | ICD-10-CM

## 2017-10-17 DIAGNOSIS — R26.0 SENSORY ATAXIC GAIT: ICD-10-CM

## 2017-10-17 DIAGNOSIS — Z99.2 STAGE 5 CHRONIC KIDNEY DISEASE ON CHRONIC DIALYSIS (HCC): ICD-10-CM

## 2017-10-17 DIAGNOSIS — L08.9 DIABETIC FOOT INFECTION (HCC): ICD-10-CM

## 2017-10-17 DIAGNOSIS — L03.119 CELLULITIS AND ABSCESS OF FOOT: ICD-10-CM

## 2017-10-17 DIAGNOSIS — E11.628 DIABETIC FOOT INFECTION (HCC): ICD-10-CM

## 2017-10-17 DIAGNOSIS — N18.6 STAGE 5 CHRONIC KIDNEY DISEASE ON CHRONIC DIALYSIS (HCC): ICD-10-CM

## 2017-10-17 DIAGNOSIS — Z89.511 STATUS POST BELOW KNEE AMPUTATION OF RIGHT LOWER EXTREMITY (HCC): ICD-10-CM

## 2017-10-17 DIAGNOSIS — Z79.899 ENCOUNTER FOR LONG-TERM (CURRENT) USE OF HIGH-RISK MEDICATION: ICD-10-CM

## 2017-10-17 DIAGNOSIS — M14.679 CHARCOT'S JOINT OF ANKLE, UNSPECIFIED LATERALITY: ICD-10-CM

## 2017-10-17 DIAGNOSIS — L02.619 CELLULITIS AND ABSCESS OF FOOT: ICD-10-CM

## 2017-10-17 RX ORDER — OXYCODONE AND ACETAMINOPHEN 10; 325 MG/1; MG/1
TABLET ORAL
Qty: 240 TAB | Refills: 0 | Status: SHIPPED | OUTPATIENT
Start: 2017-10-25 | End: 2017-10-17 | Stop reason: SDUPTHER

## 2017-10-17 RX ORDER — OXYCODONE AND ACETAMINOPHEN 10; 325 MG/1; MG/1
TABLET ORAL
Qty: 240 TAB | Refills: 0 | Status: SHIPPED | OUTPATIENT
Start: 2017-12-22 | End: 2018-05-07

## 2017-10-17 RX ORDER — OXYCODONE AND ACETAMINOPHEN 10; 325 MG/1; MG/1
TABLET ORAL
Qty: 240 TAB | Refills: 0 | Status: SHIPPED | OUTPATIENT
Start: 2017-11-23 | End: 2017-10-17 | Stop reason: SDUPTHER

## 2017-10-17 NOTE — PATIENT INSTRUCTIONS
Plan:  Continue same medications as prescribed for chronic pain  Please consider seeking treatment with a different pain management group.  We will refer you to [facility] pain management (lead physician is Dr. Lizbet Helm) for continued management of your chronic pain  Follow up in 3 months or sooner if needed  Regular exercise and attention to emotional health and diet remain the most effective ways to treat chronic pain of all kinds  You may contact me with questions or concerns through [address]

## 2017-10-17 NOTE — MR AVS SNAPSHOT
Visit Information Date & Time Provider Department Dept. Phone Encounter #  
 10/17/2017  3:40 PM Corinne Walker [address] [place] [address] for Pain Management [phone] [id number] Follow-up Instructions Return in about 3 months (around 1/17/2018). Follow-up and Disposition History Upcoming Health Maintenance Date Due Hepatitis C Screening 1965 FOOT EXAM Q1 6/5/1973 MICROALBUMIN Q1 6/5/1973 EYE EXAM RETINAL OR DILATED Q1 6/5/1973 Pneumococcal 19-64 Highest Risk (1 of 3 - PCV13) 6/5/1982 DTaP/Tdap/Td series (1 - Tdap) 6/5/1984 FOBT Q 1 YEAR AGE 50-75 6/5/2013 INFLUENZA AGE 9 TO ADULT 8/1/2017 LIPID PANEL Q1 8/16/2017 HEMOGLOBIN A1C Q6M 11/8/2017 Allergies as of 10/17/2017  Review Complete On: 10/17/2017 By: Geovanna Obando LPN Severity Noted Reaction Type Reactions Aspirin  03/26/2012    Other (comments) GI distress, unknown Ibuprofen  10/30/2014    Other (comments) Current Immunizations  Never Reviewed No immunizations on file. Not reviewed this visit You Were Diagnosed With   
  
 Codes Comments Diabetic neuropathy, painful ([facility]    -  Primary ICD-10-CM: E11.40 ICD-9-CM: 250.60, 357.2 Painful diabetic neuropathy (HCC)     ICD-10-CM: E11.40 ICD-9-CM: 250.60, 357.2 Charcot's joint of ankle, unspecified laterality     ICD-10-CM: M14.679 ICD-9-CM: 094.0, 713.5 Sensory ataxic gait     ICD-10-CM: R26.0 ICD-9-CM: 781.2 Encounter for long-term (current) use of high-risk medication     ICD-10-CM: Z79.899 ICD-9-CM: V58.69 Stage 5 chronic kidney disease on chronic dialysis (HCC)     ICD-10-CM: N18.6, Z99.2 ICD-9-CM: 585.6, V45.11 Cellulitis and abscess of foot     ICD-10-CM: L03.119, L02.619 ICD-9-CM: 682.7 Diabetic foot infection ([facility]     ICD-10-CM: E11.69, L08.9 ICD-9-CM: 250.80, 686.9  Status post below knee amputation of right lower extremity ([facility] ICD-10-CM: P24.546 ICD-9-CM: V49.75 Vitals BP Pulse Temp Resp Weight(growth percentile) BMI  
 120/70 87 98.8 °F (37.1 °C) 16 285 lb (129.3 kg) 37.6 kg/m2 Smoking Status Current Every Day Smoker BMI and BSA Data Body Mass Index Body Surface Area  
 37.6 kg/m 2 2.58 m 2 Preferred Pharmacy Pharmacy Name Phone [address] [place] [address] [address], [address] [facility] [place]. [phone] Your Updated Medication List  
  
   
This list is accurate as of: 10/17/17  4:53 PM.  Always use your most recent med list.  
  
  
  
  
 acetaminophen 325 mg tablet Commonly known as:  TYLENOL Take 650 mg by mouth every four (4) hours as needed for Pain. atorvastatin 40 mg tablet Commonly known as:  LIPITOR Take 1 Tab by mouth daily. clopidogrel 75 mg Tab Commonly known as:  PLAVIX Take 1 Tab by mouth daily. COLACE 100 mg capsule Generic drug:  docusate sodium Take 100 mg by mouth daily. FOSRENOL 1,000 mg chewable tablet Generic drug:  lanthanum Take 1,000 mg by mouth three (3) times daily. glipiZIDE SR 10 mg CR tablet Commonly known as:  GLUCOTROL XL Take 10 mg by mouth two (2) times a day. levoFLOXacin 500 mg tablet Commonly known as:  [place]et Take 1 Tab by mouth every fourty-eight (48) hours. lidocaine 5 % ointment Commonly known as:  XYLOCAINE Apply 1 g to affected area as needed for Pain. Apply to fistula site before diaylisis LINZESS 290 mcg Cap capsule Generic drug:  linaclotide Take 290 mcg by mouth Daily (before breakfast). naloxone 4 mg/actuation nasal spray Commonly known as:  NARCAN  
4 mg by Nasal route as needed for up to 2 doses. Indications: OPIOID TOXICITY  
  
 NEPHROCAPS 1 mg capsule Generic drug:  b complex-vitamin c-folic acid Take 1 Cap by mouth daily. * oxyCODONE-acetaminophen  mg per tablet Commonly known as:  PERCOCET 10  
 Take 1-2 tabs by mouth every 6 hours as needed for chronic pain. Do Not Exceed 8 tabs a Day. Indications: Pain * oxyCODONE-acetaminophen  mg per tablet Commonly known as:  PERCOCET 10 Take 1-2 tabs by mouth every 6 hours as needed for chronic pain. Do Not Exceed 8 tabs a Day. Indications: Pain * oxyCODONE-acetaminophen  mg per tablet Commonly known as:  PERCOCET 10 Take 1-2 tabs by mouth every 6 hours as needed for chronic pain. Do Not Exceed 8 tabs a Day. Indications: Pain Start taking on:  12/22/2017  
  
 pantoprazole 40 mg tablet Commonly known as:  PROTONIX Take 40 mg by mouth daily. RENVELA 800 mg Tab tab Generic drug:  sevelamer carbonate Take 1,600 mg by mouth three (3) times daily (with meals). REQUIP 0.25 mg tablet Generic drug:  rOPINIRole Take 0.25 mg by mouth daily. SENSIPAR 60 mg Tab Generic drug:  cinacalcet Take 60 mg by mouth daily. ZyrTEC 10 mg Cap Generic drug:  Cetirizine Take 10 mg by mouth daily. * Notice: This list has 3 medication(s) that are the same as other medications prescribed for you. Read the directions carefully, and ask your doctor or other care provider to review them with you. Prescriptions Printed Refills  
 oxyCODONE-acetaminophen (PERCOCET 10)  mg per tablet 0 Starting on: 12/22/2017 Sig: Take 1-2 tabs by mouth every 6 hours as needed for chronic pain. Do Not Exceed 8 tabs a Day. Indications: Pain Class: Print We Performed the Following REFERRAL TO PAIN MANAGEMENT [VSX984 Custom] Comments:  
 Mr. Sharri Mcardle has been a patient in our practice for more than four years for the treatment of chronic, severe pain related to peripheral diabetic neuropathy. He is recently s/p right BKA due to osteomyelitis and is now experiencing phantom limb pain symptoms. Due to changes in staffing, our practice is no longer able to treat neuropathic pain.  He thus requests a referral to your practice to continue his current medication regimen, which has kept his pain under adequate control. Thank you in advance for seeing this pleasant patient. Follow-up Instructions Return in about 3 months (around 1/17/2018). Referral Information Referral ID Referred By Referred To  
  
 [id number] Nelda AGUAYO [address]   
   [address] [place] [address] Phone: [phone] Fax: [phone] Visits Status Start Date End Date 1 New Request 10/17/17 10/17/18 If your referral has a status of pending review or denied, additional information will be sent to support the outcome of this decision. Patient Instructions Plan: 
Continue same medications as prescribed for chronic pain Please consider seeking treatment with a different pain management group. We will refer you to [facility] pain management (lead physician is Dr. Nazia Law) for continued management of your chronic pain Follow up in 3 months or sooner if needed Regular exercise and attention to emotional health and diet remain the most effective ways to treat chronic pain of all kinds You may contact me with questions or concerns through [address] Patient Instructions History Introducing [facility]! [employer] introduces [employer] patient portal. Now you can access parts of your medical record, email your doctor's office, and request medication refills online. 1. In your internet browser, go to [email or web address] 2. Click on the First Time User? Click Here link in the Sign In box. You will see the New Member Sign Up page. 3. Enter your [employer] Access Code exactly as it appears below. You will not need to use this code after youve completed the sign-up process. If you do not sign up before the expiration date, you must request a new code. · [employer] Access Code: [email or web address] Expires: 1/15/2018  4:31 PM 
 
4. Enter the last four digits of your Social Security Number (xxxx) and Date of Birth (mm/dd/yyyy) as indicated and click Submit. You will be taken to the next sign-up page. 5. Create a [employer] ID. This will be your [employer] login ID and cannot be changed, so think of one that is secure and easy to remember. 6. Create a [employer] password. You can change your password at any time. 7. Enter your Password Reset Question and Answer. This can be used at a later time if you forget your password. 8. Enter your e-mail address. You will receive e-mail notification when new information is available in [address] 9. Click Sign Up. You can now view and download portions of your medical record. 10. Click the Download Summary menu link to download a portable copy of your medical information. If you have questions, please visit the Frequently Asked Questions section of the [employer] website. Remember, [employer] is NOT to be used for urgent needs. For medical emergencies, dial 911. Now available from your iPhone and Android! Please provide this summary of care documentation to your next provider. Your primary care clinician is listed as Leticia Rod. If you have any questions after today's visit, please call [phone].

## 2017-10-17 NOTE — PROGRESS NOTES
HISTORY OF PRESENT ILLNESS  Marieta Dakins is a 45 y.o. male. Patient presents for follow up of chronic lower extremity pain due to severe diabetic neuropathy as well as Charcot foot with complete arch collapse of the left foot. He is on dialysis for end-stage renal disease as well. He reports that he underwent right below the knee amputation due to osteomyelitis of the right foot on 7/7/17. This was extremely hard for him psychologically, and he states \"I cried like a baby for days\" but reports that he is feeling better now that he is fitted for a prosthesis. We discussed the impact of his smoking and poor blood glucose control on his healing. He will continue his cessation efforts and try to check his blood glucose more consistently. Pt reports average of 5-7/10 pain scale most days. We also discussed the departure of Dr. Gurmeet Desai and myself from the practice and discussed at length \"next steps\". He understands that due to his multiple FYIs and complex medical conditions, he will not likely be accepted at this practice after today's visit. We discussed treatment options at length--see treatment plan below. Medications continue to work well for pain control overall. Marieta Dakins is tolerating medications well, with no untoward side effects noted. He is able to stay more active with less discomfort with these current doses. The patient reports an average of 50% relief with this regimen. Most recent UDS and  were consistent with prescribed medications. Pill counts are appropriate. He is informed of side effects, risks, and benefits of this regimen, and emphasizes that he derives a significant improvement in functionality and quality of life, and notes that non-opioid medications and therapies in the past have not offered significant benefit. He denies new or worsening insomnia or constipation issues. He denies any falls, injuries, or hospitalizations since the last visit.    A total of 45 minutes was spent with the patient of which more than 50% of the time was spent counseling the patient. HPI--see above    ROS  Constitutional: Positive for malaise/fatigue. Musculoskeletal: Positive for myalgias and joint pain. Neurological: Positive for tingling, sensory change and focal weakness (of lower extremities). Physical Exam  Constitutional: He is oriented to person, place, and time. He appears well-developed and well-nourished. Talkative, pleasant male in visible discomfort  Brought in on a stretcher  S/p right BKA  ASSESSMENT and PLAN  Encounter Diagnoses     ICD-10-CM ICD-9-CM   1. Diabetic neuropathy, painful ([facility] E11.40 250.60     357.2   2. Painful diabetic neuropathy ([facility]) E11.40 250.60     357.2   3. Charcot's joint of ankle, unspecified laterality M14.679 094.0     713.5   4. Sensory ataxic gait R26.0 781.2   5. Encounter for long-term (current) use of high-risk medication Z79.899 V58.69   6. Stage 5 chronic kidney disease on chronic dialysis ([facility]) N18.6 585.6    Z99.2 V45.11   7. Cellulitis and abscess of foot L03.119 682.7    L02.619    8. Diabetic foot infection ([facility] E11.69 250.80    L08.9 686.9   9. Status post below knee amputation of right lower extremity ([facility]) Z89.511 V49.75      Plan:  Continue same medications as prescribed for chronic pain  Please consider seeking treatment with a different pain management group.  We will refer you to [facility] pain management (lead physician is Dr. Adeline Kapadia) for continued management of your chronic pain  Follow up in 3 months or sooner if needed  Regular exercise and attention to emotional health and diet remain the most effective ways to treat chronic pain of all kinds  You may contact me with questions or concerns through [address]

## 2017-10-17 NOTE — PROGRESS NOTES
Nursing Notes    Patient presents to the office today in follow-up. Patient rates his pain at 5/10 on the numerical pain scale. Reviewed medications with counts as follows:    Rx Date filled Qty Dispensed Pill Count Last Dose Short   Percocet 10 mg 09/26/17 240 80 today no         Comments:  Patient is here today for a follow up appt. He states his pain level today a 5  He states he went to the ER [facility] due to having chest pain. He states labs were taken and imaging was done . He states that he was in there for 3 days he that he had another MI. He found out it was acid reflux. POC UDS was not performed in office today    Any new labs or imaging since last appointment? YES labs done at [facility] and Ekg done at [facility]    Have you been to an emergency room (ER) or urgent care clinic since your last visit? YES        Kalyani    Have you been hospitalized since your last visit? YES   3 days   If yes, where, when, and reason for visit? Have you seen or consulted any other health care providers outside of the [address] [place] [address]  since your last visit? YES [facility]      If yes, where, when, and reason for visit? Mr. Armando Rodriguez has a reminder for a \"due or due soon\" health maintenance. I have asked that he contact his primary care provider for follow-up on this health maintenance.

## 2019-11-13 ENCOUNTER — HOSPITAL ENCOUNTER (OUTPATIENT)
Dept: LAB | Age: 54
Discharge: HOME | End: 2019-11-13

## 2019-11-13 LAB
ANION GAP SERPL CALC-SCNC: 9 MMOL/L (ref 3–18)
BASOPHILS # BLD: 0 K/UL (ref 0–0.1)
BASOPHILS NFR BLD: 0 % (ref 0–2)
BUN SERPL-MCNC: 10 MG/DL (ref 7–18)
BUN/CREAT SERPL: 3 (ref 12–20)
CALCIUM SERPL-MCNC: 8.7 MG/DL (ref 8.5–10.1)
CHLORIDE SERPL-SCNC: 97 MMOL/L (ref 100–111)
CO2 SERPL-SCNC: 29 MMOL/L (ref 21–32)
CREAT SERPL-MCNC: 3.94 MG/DL (ref 0.6–1.3)
DIFFERENTIAL METHOD BLD: ABNORMAL
EOSINOPHIL # BLD: 0.3 K/UL (ref 0–0.4)
EOSINOPHIL NFR BLD: 4 % (ref 0–5)
ERYTHROCYTE [DISTWIDTH] IN BLOOD BY AUTOMATED COUNT: 16.6 % (ref 11.6–14.5)
GLUCOSE SERPL-MCNC: 359 MG/DL (ref 74–99)
HCT VFR BLD AUTO: 32.1 % (ref 36–48)
HGB BLD-MCNC: 9.5 G/DL (ref 13–16)
LYMPHOCYTES # BLD: 1.8 K/UL (ref 0.9–3.6)
LYMPHOCYTES NFR BLD: 20 % (ref 21–52)
MCH RBC QN AUTO: 28.6 PG (ref 24–34)
MCHC RBC AUTO-ENTMCNC: 29.6 G/DL (ref 31–37)
MCV RBC AUTO: 96.7 FL (ref 74–97)
MONOCYTES # BLD: 0.6 K/UL (ref 0.05–1.2)
MONOCYTES NFR BLD: 7 % (ref 3–10)
NEUTS SEG # BLD: 6 K/UL (ref 1.8–8)
NEUTS SEG NFR BLD: 69 % (ref 40–73)
PLATELET # BLD AUTO: 168 K/UL (ref 135–420)
PMV BLD AUTO: 12.4 FL (ref 9.2–11.8)
POTASSIUM SERPL-SCNC: 3.7 MMOL/L (ref 3.5–5.5)
RBC # BLD AUTO: 3.32 M/UL (ref 4.7–5.5)
SODIUM SERPL-SCNC: 135 MMOL/L (ref 136–145)
WBC # BLD AUTO: 8.7 K/UL (ref 4.6–13.2)

## 2019-11-13 PROCEDURE — 85025 COMPLETE CBC W/AUTO DIFF WBC: CPT

## 2019-11-13 PROCEDURE — 80048 BASIC METABOLIC PNL TOTAL CA: CPT

## 2020-02-24 PROBLEM — R07.2 PRECORDIAL CHEST PAIN: Status: ACTIVE | Noted: 2020-02-24

## 2020-04-06 PROBLEM — N18.6 ESRD ON DIALYSIS (HCC): Status: ACTIVE | Noted: 2020-04-06

## 2020-04-06 PROBLEM — T82.838A HEMORRHAGE FROM ARTERIOVENOUS DIALYSIS GRAFT (HCC): Status: ACTIVE | Noted: 2020-04-06

## 2020-04-06 PROBLEM — Z99.2 ESRD ON DIALYSIS (HCC): Status: ACTIVE | Noted: 2020-04-06

## 2020-10-21 PROBLEM — R07.9 CHEST PAIN: Status: ACTIVE | Noted: 2020-02-24

## 2020-10-21 PROBLEM — I95.9 HYPOTENSION: Status: ACTIVE | Noted: 2020-10-21

## 2020-11-17 ENCOUNTER — HOSPITAL ENCOUNTER (OUTPATIENT)
Dept: LAB | Age: 55
Discharge: HOME | End: 2020-11-17

## 2020-11-17 LAB
ALBUMIN SERPL-MCNC: 2.2 G/DL (ref 3.4–5)
ALBUMIN/GLOB SERPL: 0.6 {RATIO} (ref 0.8–1.7)
ALP SERPL-CCNC: 154 U/L (ref 45–117)
ALT SERPL-CCNC: <6 U/L (ref 16–61)
AST SERPL-CCNC: 11 U/L (ref 10–38)
BASOPHILS # BLD: 0 K/UL (ref 0–0.1)
BASOPHILS NFR BLD: 0 % (ref 0–2)
BILIRUB DIRECT SERPL-MCNC: 0.2 MG/DL (ref 0–0.2)
BILIRUB SERPL-MCNC: 0.5 MG/DL (ref 0.2–1)
DIFFERENTIAL METHOD BLD: ABNORMAL
EOSINOPHIL # BLD: 0.2 K/UL (ref 0–0.4)
EOSINOPHIL NFR BLD: 1 % (ref 0–5)
ERYTHROCYTE [DISTWIDTH] IN BLOOD BY AUTOMATED COUNT: 17.4 % (ref 11.6–14.5)
GLOBULIN SER CALC-MCNC: 3.7 G/DL (ref 2–4)
HCT VFR BLD AUTO: 30.7 % (ref 36–48)
HGB BLD-MCNC: 8.5 G/DL (ref 13–16)
LYMPHOCYTES # BLD: 1.1 K/UL (ref 0.9–3.6)
LYMPHOCYTES NFR BLD: 7 % (ref 21–52)
MCH RBC QN AUTO: 26.5 PG (ref 24–34)
MCHC RBC AUTO-ENTMCNC: 27.7 G/DL (ref 31–37)
MCV RBC AUTO: 95.6 FL (ref 74–97)
MONOCYTES # BLD: 1.5 K/UL (ref 0.05–1.2)
MONOCYTES NFR BLD: 9 % (ref 3–10)
NEUTS SEG # BLD: 14 K/UL (ref 1.8–8)
NEUTS SEG NFR BLD: 83 % (ref 40–73)
PLATELET # BLD AUTO: 187 K/UL (ref 135–420)
PMV BLD AUTO: 13.3 FL (ref 9.2–11.8)
PROT SERPL-MCNC: 5.9 G/DL (ref 6.4–8.2)
RBC # BLD AUTO: 3.21 M/UL (ref 4.7–5.5)
WBC # BLD AUTO: 16.9 K/UL (ref 4.6–13.2)

## 2020-11-17 PROCEDURE — 85025 COMPLETE CBC W/AUTO DIFF WBC: CPT

## 2020-11-17 PROCEDURE — 80076 HEPATIC FUNCTION PANEL: CPT

## 2020-11-19 ENCOUNTER — HOSPITAL ENCOUNTER (OUTPATIENT)
Dept: LAB | Age: 55
Discharge: HOME | End: 2020-11-19

## 2020-11-19 LAB
BASOPHILS # BLD: 0 K/UL (ref 0–0.06)
BASOPHILS NFR BLD: 0 % (ref 0–3)
DIFFERENTIAL METHOD BLD: ABNORMAL
EOSINOPHIL # BLD: 0.2 K/UL (ref 0–0.4)
EOSINOPHIL NFR BLD: 2 % (ref 0–5)
ERYTHROCYTE [DISTWIDTH] IN BLOOD BY AUTOMATED COUNT: 17.3 % (ref 11.6–14.5)
HCT VFR BLD AUTO: 29 % (ref 36–48)
HGB BLD-MCNC: 8.5 G/DL (ref 13–16)
LYMPHOCYTES # BLD: 0.9 K/UL (ref 0.8–3.5)
LYMPHOCYTES NFR BLD: 8 % (ref 20–51)
MCH RBC QN AUTO: 26.6 PG (ref 24–34)
MCHC RBC AUTO-ENTMCNC: 29.3 G/DL (ref 31–37)
MCV RBC AUTO: 90.6 FL (ref 74–97)
MONOCYTES # BLD: 0.8 K/UL (ref 0–1)
MONOCYTES NFR BLD: 7 % (ref 2–9)
NEUTS SEG # BLD: 9.3 K/UL (ref 1.8–8)
NEUTS SEG NFR BLD: 83 % (ref 42–75)
PLATELET # BLD AUTO: 177 K/UL (ref 135–420)
PLATELET COMMENTS,PCOM: ABNORMAL
RBC # BLD AUTO: 3.2 M/UL (ref 4.7–5.5)
RBC MORPH BLD: ABNORMAL
RBC MORPH BLD: ABNORMAL
WBC # BLD AUTO: 11.2 K/UL (ref 4.6–13.2)

## 2020-11-19 PROCEDURE — 85025 COMPLETE CBC W/AUTO DIFF WBC: CPT

## 2020-12-03 ENCOUNTER — HOSPITAL ENCOUNTER (OUTPATIENT)
Dept: LAB | Age: 55
Discharge: HOME | End: 2020-12-03

## 2020-12-03 LAB
INR PPP: 1.1 (ref 0.8–1.2)
PROTHROMBIN TIME: 13.7 SEC (ref 11.5–15.2)

## 2020-12-03 PROCEDURE — 85610 PROTHROMBIN TIME: CPT

## 2020-12-23 ENCOUNTER — HOSPITAL ENCOUNTER (OUTPATIENT)
Dept: LAB | Age: 55
Discharge: HOME | End: 2020-12-23

## 2020-12-23 LAB
INR PPP: 1.7 (ref 0.8–1.2)
PROTHROMBIN TIME: 19.7 SEC (ref 11.5–15.2)

## 2020-12-23 PROCEDURE — 85610 PROTHROMBIN TIME: CPT

## 2021-02-23 ENCOUNTER — HOSPITAL ENCOUNTER (OUTPATIENT)
Dept: LAB | Age: 56
Discharge: HOME | End: 2021-02-23

## 2021-02-23 LAB
ANION GAP SERPL CALC-SCNC: 7 MMOL/L (ref 3–18)
BASOPHILS # BLD: 0 K/UL (ref 0–0.1)
BASOPHILS NFR BLD: 0 % (ref 0–2)
BUN SERPL-MCNC: 26 MG/DL (ref 7–18)
BUN/CREAT SERPL: 5 (ref 12–20)
CALCIUM SERPL-MCNC: 9.1 MG/DL (ref 8.5–10.1)
CHLORIDE SERPL-SCNC: 99 MMOL/L (ref 100–111)
CO2 SERPL-SCNC: 30 MMOL/L (ref 21–32)
CREAT SERPL-MCNC: 5.01 MG/DL (ref 0.6–1.3)
DIFFERENTIAL METHOD BLD: ABNORMAL
EOSINOPHIL # BLD: 0.4 K/UL (ref 0–0.4)
EOSINOPHIL NFR BLD: 6 % (ref 0–5)
ERYTHROCYTE [DISTWIDTH] IN BLOOD BY AUTOMATED COUNT: 16.6 % (ref 11.6–14.5)
GLUCOSE SERPL-MCNC: 81 MG/DL (ref 74–99)
HCT VFR BLD AUTO: 37.2 % (ref 36–48)
HGB BLD-MCNC: 11 G/DL (ref 13–16)
LYMPHOCYTES # BLD: 1.2 K/UL (ref 0.9–3.6)
LYMPHOCYTES NFR BLD: 20 % (ref 21–52)
MCH RBC QN AUTO: 27.5 PG (ref 24–34)
MCHC RBC AUTO-ENTMCNC: 29.6 G/DL (ref 31–37)
MCV RBC AUTO: 93 FL (ref 74–97)
MONOCYTES # BLD: 0.7 K/UL (ref 0.05–1.2)
MONOCYTES NFR BLD: 11 % (ref 3–10)
NEUTS SEG # BLD: 3.8 K/UL (ref 1.8–8)
NEUTS SEG NFR BLD: 63 % (ref 40–73)
PLATELET # BLD AUTO: 81 K/UL (ref 135–420)
POTASSIUM SERPL-SCNC: 4.5 MMOL/L (ref 3.5–5.5)
RBC # BLD AUTO: 4 M/UL (ref 4.7–5.5)
SODIUM SERPL-SCNC: 136 MMOL/L (ref 136–145)
WBC # BLD AUTO: 6.1 K/UL (ref 4.6–13.2)

## 2021-02-23 PROCEDURE — 80048 BASIC METABOLIC PNL TOTAL CA: CPT

## 2021-02-23 PROCEDURE — 85025 COMPLETE CBC W/AUTO DIFF WBC: CPT

## 2021-08-03 PROBLEM — N52.9 ERECTILE DYSFUNCTION: Status: RESOLVED | Noted: 2021-08-03 | Resolved: 2021-08-03

## 2021-08-03 PROBLEM — I50.9 CHF (CONGESTIVE HEART FAILURE) (HCC): Status: RESOLVED | Noted: 2021-08-03 | Resolved: 2021-08-03

## 2021-10-18 PROBLEM — R77.8 ELEVATED TROPONIN: Status: ACTIVE | Noted: 2021-10-18

## 2021-10-18 PROBLEM — N18.6 ESRD (END STAGE RENAL DISEASE) (HCC): Status: ACTIVE | Noted: 2021-10-18
